# Patient Record
Sex: FEMALE
[De-identification: names, ages, dates, MRNs, and addresses within clinical notes are randomized per-mention and may not be internally consistent; named-entity substitution may affect disease eponyms.]

---

## 2024-06-11 ENCOUNTER — ASOB RESULT (OUTPATIENT)
Age: 34
End: 2024-06-11

## 2024-06-11 ENCOUNTER — APPOINTMENT (OUTPATIENT)
Dept: ANTEPARTUM | Facility: CLINIC | Age: 34
End: 2024-06-11

## 2024-06-11 ENCOUNTER — TRANSCRIPTION ENCOUNTER (OUTPATIENT)
Age: 34
End: 2024-06-11

## 2024-06-11 PROCEDURE — 93975 VASCULAR STUDY: CPT

## 2024-06-11 PROCEDURE — 36415 COLL VENOUS BLD VENIPUNCTURE: CPT

## 2024-06-11 PROCEDURE — 76813 OB US NUCHAL MEAS 1 GEST: CPT

## 2024-07-03 ENCOUNTER — ASOB RESULT (OUTPATIENT)
Age: 34
End: 2024-07-03

## 2024-07-03 ENCOUNTER — APPOINTMENT (OUTPATIENT)
Dept: ANTEPARTUM | Facility: CLINIC | Age: 34
End: 2024-07-03

## 2024-07-03 PROCEDURE — 76817 TRANSVAGINAL US OBSTETRIC: CPT

## 2024-07-03 PROCEDURE — 76805 OB US >/= 14 WKS SNGL FETUS: CPT | Mod: 59

## 2024-08-09 ENCOUNTER — ASOB RESULT (OUTPATIENT)
Age: 34
End: 2024-08-09

## 2024-08-09 ENCOUNTER — APPOINTMENT (OUTPATIENT)
Dept: ANTEPARTUM | Facility: CLINIC | Age: 34
End: 2024-08-09

## 2024-08-09 PROCEDURE — 76817 TRANSVAGINAL US OBSTETRIC: CPT

## 2024-08-09 PROCEDURE — 76811 OB US DETAILED SNGL FETUS: CPT | Mod: 59

## 2024-08-15 PROBLEM — Z00.00 ENCOUNTER FOR PREVENTIVE HEALTH EXAMINATION: Status: ACTIVE | Noted: 2024-08-15

## 2024-10-11 ENCOUNTER — ASOB RESULT (OUTPATIENT)
Age: 34
End: 2024-10-11

## 2024-10-11 ENCOUNTER — APPOINTMENT (OUTPATIENT)
Dept: ANTEPARTUM | Facility: CLINIC | Age: 34
End: 2024-10-11

## 2024-10-11 PROCEDURE — 76820 UMBILICAL ARTERY ECHO: CPT | Mod: 59

## 2024-10-11 PROCEDURE — 76819 FETAL BIOPHYS PROFIL W/O NST: CPT | Mod: 59

## 2024-10-11 PROCEDURE — 76816 OB US FOLLOW-UP PER FETUS: CPT

## 2024-11-12 ENCOUNTER — ASOB RESULT (OUTPATIENT)
Age: 34
End: 2024-11-12

## 2024-11-12 ENCOUNTER — APPOINTMENT (OUTPATIENT)
Dept: ANTEPARTUM | Facility: CLINIC | Age: 34
End: 2024-11-12

## 2024-11-12 PROCEDURE — 76820 UMBILICAL ARTERY ECHO: CPT | Mod: 59

## 2024-11-12 PROCEDURE — 76816 OB US FOLLOW-UP PER FETUS: CPT

## 2024-11-12 PROCEDURE — 76819 FETAL BIOPHYS PROFIL W/O NST: CPT | Mod: 59

## 2024-11-27 ENCOUNTER — APPOINTMENT (OUTPATIENT)
Dept: ANTEPARTUM | Facility: CLINIC | Age: 34
End: 2024-11-27

## 2024-11-27 ENCOUNTER — ASOB RESULT (OUTPATIENT)
Age: 34
End: 2024-11-27

## 2024-11-27 PROCEDURE — 76816 OB US FOLLOW-UP PER FETUS: CPT

## 2024-11-27 PROCEDURE — 76819 FETAL BIOPHYS PROFIL W/O NST: CPT

## 2024-11-27 PROCEDURE — 76820 UMBILICAL ARTERY ECHO: CPT | Mod: 59

## 2024-12-03 ENCOUNTER — INPATIENT (INPATIENT)
Facility: HOSPITAL | Age: 34
LOS: 1 days | Discharge: HOME | End: 2024-12-05
Attending: OBSTETRICS & GYNECOLOGY | Admitting: OBSTETRICS & GYNECOLOGY
Payer: COMMERCIAL

## 2024-12-03 VITALS
TEMPERATURE: 98 F | OXYGEN SATURATION: 100 % | HEART RATE: 85 BPM | DIASTOLIC BLOOD PRESSURE: 86 MMHG | RESPIRATION RATE: 16 BRPM | SYSTOLIC BLOOD PRESSURE: 121 MMHG

## 2024-12-03 DIAGNOSIS — O26.899 OTHER SPECIFIED PREGNANCY RELATED CONDITIONS, UNSPECIFIED TRIMESTER: ICD-10-CM

## 2024-12-03 LAB
BASOPHILS # BLD AUTO: 0.03 K/UL — SIGNIFICANT CHANGE UP (ref 0–0.2)
BASOPHILS NFR BLD AUTO: 0.3 % — SIGNIFICANT CHANGE UP (ref 0–2)
BLD GP AB SCN SERPL QL: NEGATIVE — SIGNIFICANT CHANGE UP
EOSINOPHIL # BLD AUTO: 0.07 K/UL — SIGNIFICANT CHANGE UP (ref 0–0.5)
EOSINOPHIL NFR BLD AUTO: 0.8 % — SIGNIFICANT CHANGE UP (ref 0–6)
HCT VFR BLD CALC: 34.7 % — SIGNIFICANT CHANGE UP (ref 34.5–45)
HGB BLD-MCNC: 12.1 G/DL — SIGNIFICANT CHANGE UP (ref 11.5–15.5)
IMM GRANULOCYTES NFR BLD AUTO: 0.3 % — SIGNIFICANT CHANGE UP (ref 0–0.9)
LYMPHOCYTES # BLD AUTO: 2.06 K/UL — SIGNIFICANT CHANGE UP (ref 1–3.3)
LYMPHOCYTES # BLD AUTO: 23.7 % — SIGNIFICANT CHANGE UP (ref 13–44)
MCHC RBC-ENTMCNC: 31.2 PG — SIGNIFICANT CHANGE UP (ref 27–34)
MCHC RBC-ENTMCNC: 34.9 G/DL — SIGNIFICANT CHANGE UP (ref 32–36)
MCV RBC AUTO: 89.4 FL — SIGNIFICANT CHANGE UP (ref 80–100)
MONOCYTES # BLD AUTO: 0.53 K/UL — SIGNIFICANT CHANGE UP (ref 0–0.9)
MONOCYTES NFR BLD AUTO: 6.1 % — SIGNIFICANT CHANGE UP (ref 2–14)
NEUTROPHILS # BLD AUTO: 5.99 K/UL — SIGNIFICANT CHANGE UP (ref 1.8–7.4)
NEUTROPHILS NFR BLD AUTO: 68.8 % — SIGNIFICANT CHANGE UP (ref 43–77)
NRBC # BLD: 0 /100 WBCS — SIGNIFICANT CHANGE UP (ref 0–0)
PLATELET # BLD AUTO: 218 K/UL — SIGNIFICANT CHANGE UP (ref 150–400)
RBC # BLD: 3.88 M/UL — SIGNIFICANT CHANGE UP (ref 3.8–5.2)
RBC # FLD: 13.5 % — SIGNIFICANT CHANGE UP (ref 10.3–14.5)
RH IG SCN BLD-IMP: POSITIVE — SIGNIFICANT CHANGE UP
RH IG SCN BLD-IMP: POSITIVE — SIGNIFICANT CHANGE UP
T PALLIDUM AB TITR SER: NEGATIVE — SIGNIFICANT CHANGE UP
WBC # BLD: 8.71 K/UL — SIGNIFICANT CHANGE UP (ref 3.8–10.5)
WBC # FLD AUTO: 8.71 K/UL — SIGNIFICANT CHANGE UP (ref 3.8–10.5)

## 2024-12-03 RX ORDER — KETOROLAC TROMETHAMINE 30 MG/ML
30 INJECTION INTRAMUSCULAR; INTRAVENOUS ONCE
Refills: 0 | Status: DISCONTINUED | OUTPATIENT
Start: 2024-12-03 | End: 2024-12-03

## 2024-12-03 RX ORDER — AMPICILLIN TRIHYDRATE 250 MG/5ML
1 SUSPENSION, RECONSTITUTED, ORAL (ML) ORAL EVERY 4 HOURS
Refills: 0 | Status: DISCONTINUED | OUTPATIENT
Start: 2024-12-03 | End: 2024-12-03

## 2024-12-03 RX ORDER — IBUPROFEN 200 MG
600 TABLET ORAL EVERY 6 HOURS
Refills: 0 | Status: DISCONTINUED | OUTPATIENT
Start: 2024-12-03 | End: 2024-12-05

## 2024-12-03 RX ORDER — SIMETHICONE 125 MG
80 CAPSULE ORAL EVERY 4 HOURS
Refills: 0 | Status: DISCONTINUED | OUTPATIENT
Start: 2024-12-03 | End: 2024-12-05

## 2024-12-03 RX ORDER — METOPROLOL TARTRATE 100 MG/1
50 TABLET, FILM COATED ORAL DAILY
Refills: 0 | Status: DISCONTINUED | OUTPATIENT
Start: 2024-12-03 | End: 2024-12-03

## 2024-12-03 RX ORDER — HYDROCORTISONE BUTYRATE 0.1 %
1 CREAM (GRAM) TOPICAL EVERY 6 HOURS
Refills: 0 | Status: DISCONTINUED | OUTPATIENT
Start: 2024-12-03 | End: 2024-12-05

## 2024-12-03 RX ORDER — IBUPROFEN 200 MG
600 TABLET ORAL EVERY 6 HOURS
Refills: 0 | Status: COMPLETED | OUTPATIENT
Start: 2024-12-03 | End: 2025-11-01

## 2024-12-03 RX ORDER — AMPICILLIN TRIHYDRATE 250 MG/5ML
2 SUSPENSION, RECONSTITUTED, ORAL (ML) ORAL ONCE
Refills: 0 | Status: COMPLETED | OUTPATIENT
Start: 2024-12-03 | End: 2024-12-03

## 2024-12-03 RX ORDER — WITCH HAZEL 50 G/100ML
1 SOLUTION RECTAL EVERY 4 HOURS
Refills: 0 | Status: DISCONTINUED | OUTPATIENT
Start: 2024-12-03 | End: 2024-12-05

## 2024-12-03 RX ORDER — TETANUS TOXOID, REDUCED DIPHTHERIA TOXOID AND ACELLULAR PERTUSSIS VACCINE, ADSORBED 5; 2.5; 8; 8; 2.5 [IU]/.5ML; [IU]/.5ML; UG/.5ML; UG/.5ML; UG/.5ML
0.5 SUSPENSION INTRAMUSCULAR ONCE
Refills: 0 | Status: DISCONTINUED | OUTPATIENT
Start: 2024-12-03 | End: 2024-12-05

## 2024-12-03 RX ORDER — TRISODIUM CITRATE DIHYDRATE AND CITRIC ACID MONOHYDRATE 500; 334 MG/5ML; MG/5ML
15 SOLUTION ORAL EVERY 6 HOURS
Refills: 0 | Status: DISCONTINUED | OUTPATIENT
Start: 2024-12-03 | End: 2024-12-03

## 2024-12-03 RX ORDER — .BETA.-CAROTENE, SODIUM ACETATE, ASCORBIC ACID, CHOLECALCIFEROL, .ALPHA.-TOCOPHEROL ACETATE, DL-, THIAMINE MONONITRATE, RIBOFLAVIN, NIACINAMIDE, PYRIDOXINE HYDROCHLORIDE, FOLIC ACID, CYANOCOBALAMIN, CALCIUM CARBONATE, FERROUS FUMARATE, ZINC OXIDE AND CUPRIC OXIDE 2000; 2000; 120; 400; 22; 1.84; 3; 20; 10; 1; 12; 200; 27; 25; 2 [IU]/1; [IU]/1; MG/1; [IU]/1; MG/1; MG/1; MG/1; MG/1; MG/1; MG/1; UG/1; MG/1; MG/1; MG/1; MG/1
1 TABLET ORAL DAILY
Refills: 0 | Status: DISCONTINUED | OUTPATIENT
Start: 2024-12-03 | End: 2024-12-05

## 2024-12-03 RX ORDER — 0.9 % SODIUM CHLORIDE 0.9 %
1000 INTRAVENOUS SOLUTION INTRAVENOUS
Refills: 0 | Status: DISCONTINUED | OUTPATIENT
Start: 2024-12-03 | End: 2024-12-03

## 2024-12-03 RX ORDER — BENZOCAINE 10 %
1 OINTMENT (GRAM) TOPICAL EVERY 6 HOURS
Refills: 0 | Status: DISCONTINUED | OUTPATIENT
Start: 2024-12-03 | End: 2024-12-05

## 2024-12-03 RX ORDER — METOPROLOL TARTRATE 100 MG/1
50 TABLET, FILM COATED ORAL DAILY
Refills: 0 | Status: DISCONTINUED | OUTPATIENT
Start: 2024-12-03 | End: 2024-12-05

## 2024-12-03 RX ORDER — SODIUM CHLORIDE 9 MG/ML
3 INJECTION, SOLUTION INTRAMUSCULAR; INTRAVENOUS; SUBCUTANEOUS EVERY 8 HOURS
Refills: 0 | Status: DISCONTINUED | OUTPATIENT
Start: 2024-12-03 | End: 2024-12-05

## 2024-12-03 RX ORDER — FENTANYL/BUPIVACAINE/NS/PF 2-625MCG/1
250 PLASTIC BAG, INJECTION (ML) INJECTION
Refills: 0 | Status: DISCONTINUED | OUTPATIENT
Start: 2024-12-03 | End: 2024-12-03

## 2024-12-03 RX ORDER — OXYCODONE HYDROCHLORIDE 30 MG/1
5 TABLET ORAL ONCE
Refills: 0 | Status: DISCONTINUED | OUTPATIENT
Start: 2024-12-03 | End: 2024-12-05

## 2024-12-03 RX ORDER — LANOLIN 72 %
1 OINTMENT (GRAM) TOPICAL EVERY 6 HOURS
Refills: 0 | Status: DISCONTINUED | OUTPATIENT
Start: 2024-12-03 | End: 2024-12-05

## 2024-12-03 RX ORDER — PRAMOXINE HYDROCHLORIDE 1 MG/ML
1 LOTION TOPICAL EVERY 4 HOURS
Refills: 0 | Status: DISCONTINUED | OUTPATIENT
Start: 2024-12-03 | End: 2024-12-05

## 2024-12-03 RX ORDER — ACETAMINOPHEN 500MG 500 MG/1
975 TABLET, COATED ORAL
Refills: 0 | Status: DISCONTINUED | OUTPATIENT
Start: 2024-12-03 | End: 2024-12-05

## 2024-12-03 RX ORDER — DIPHENHYDRAMINE HCL 25 MG
25 CAPSULE ORAL EVERY 6 HOURS
Refills: 0 | Status: DISCONTINUED | OUTPATIENT
Start: 2024-12-03 | End: 2024-12-05

## 2024-12-03 RX ORDER — OXYCODONE HYDROCHLORIDE 30 MG/1
5 TABLET ORAL
Refills: 0 | Status: DISCONTINUED | OUTPATIENT
Start: 2024-12-03 | End: 2024-12-05

## 2024-12-03 RX ORDER — CHLORHEXIDINE GLUCONATE 1.2 MG/ML
1 RINSE ORAL DAILY
Refills: 0 | Status: DISCONTINUED | OUTPATIENT
Start: 2024-12-03 | End: 2024-12-03

## 2024-12-03 RX ORDER — DIBUCAINE 1 %
1 OINTMENT (GRAM) TOPICAL EVERY 6 HOURS
Refills: 0 | Status: DISCONTINUED | OUTPATIENT
Start: 2024-12-03 | End: 2024-12-05

## 2024-12-03 RX ADMIN — ACETAMINOPHEN 500MG 975 MILLIGRAM(S): 500 TABLET, COATED ORAL at 14:56

## 2024-12-03 RX ADMIN — Medication 125 MILLILITER(S): at 05:39

## 2024-12-03 RX ADMIN — SODIUM CHLORIDE 3 MILLILITER(S): 9 INJECTION, SOLUTION INTRAMUSCULAR; INTRAVENOUS; SUBCUTANEOUS at 21:40

## 2024-12-03 RX ADMIN — Medication 216 GRAM(S): at 06:56

## 2024-12-03 RX ADMIN — Medication 108 GRAM(S): at 11:15

## 2024-12-03 RX ADMIN — Medication 600 MILLIGRAM(S): at 18:47

## 2024-12-03 RX ADMIN — ACETAMINOPHEN 500MG 975 MILLIGRAM(S): 500 TABLET, COATED ORAL at 21:07

## 2024-12-03 RX ADMIN — KETOROLAC TROMETHAMINE 30 MILLIGRAM(S): 30 INJECTION INTRAMUSCULAR; INTRAVENOUS at 14:10

## 2024-12-03 RX ADMIN — Medication 600 MILLIGRAM(S): at 23:51

## 2024-12-03 NOTE — OB RN TRIAGE NOTE - FALL HARM RISK - UNIVERSAL INTERVENTIONS
Bed in lowest position, wheels locked, appropriate side rails in place/Call bell, personal items and telephone in reach/Instruct patient to call for assistance before getting out of bed or chair/Non-slip footwear when patient is out of bed/[place] to call system/Physically safe environment - no spills, clutter or unnecessary equipment/Purposeful Proactive Rounding/Room/bathroom lighting operational, light cord in reach

## 2024-12-03 NOTE — OB PROVIDER DELIVERY SUMMARY - NSPROVIDERDELIVERYNOTE_OBGYN_ALL_OB_FT
Pt delivered  in OP position over intact perineum. Tight nuchal cord x 1 noted, which was reduced prior to delivery of the shoulders. Shoulders delivered spontaneously. The  was then placed on the maternal abdomen and cried vigorously. Delayed cord clamping x 1 minute was performed. The placenta then delivered spontaneously. A 2nd degree perineal laceration was noted, which was repaired with 2-0 vicryl with adequate hemostasis. EBL 300cc. Mother and baby recovering in LDR.

## 2024-12-03 NOTE — OB PROVIDER DELIVERY SUMMARY - NSSELHIDDEN_OBGYN_ALL_OB_FT
[[email or web address]_OBGYN_ALL_OB_FT:[id number]],[NS_DeliveryRN_OBGYN_ALL_OB_FT:[id number]=]

## 2024-12-03 NOTE — OB RN DELIVERY SUMMARY - NSSELHIDDEN_OBGYN_ALL_OB_FT
[[email or web address]_OBGYN_ALL_OB_FT:[id number]=],[NS_DeliveryRN_OBGYN_ALL_OB_FT:[id number]=] Detail Level: Generalized Detail Level: Simple

## 2024-12-03 NOTE — OB RN PATIENT PROFILE - NSICDXPASTMEDICALHX_GEN_ALL_CORE_FT
PAST MEDICAL HISTORY:  No pertinent past medical history PAST MEDICAL HISTORY:  SVT (supraventricular tachycardia)

## 2024-12-03 NOTE — OB PROVIDER H&P - HISTORY OF PRESENT ILLNESS
35 y/o  at 38w3d presenting for evaluation for painful contractions since . Per patient, she began to feel painful contractions since  yesterday; they have become more frequent and more painful. She does not want an epidural at this time. She endorses that contractions are more frequent and are occurring every 5-7 minutes. She denies loss of fluid and vaginal bleeding. She endorses normal fetal movement.    Ante: Spontaneous pregnancy. NIPT and anatomy scan wnl. Passed GCT. Denies elevated blood pressures in this pregnancy.  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 Taking Valtrex since week 36. Endorses remote hx of one abnormal pap smear in . Denies fibroids, ovarian cysts, STI  MedHX: Hx of SVT (previously on metoprolol 50 and self d'jaciel, seen by cardiology in this pregnancy with plan for ablation in postpartum period).  Surghx: denies  Medications: denies  Allergies: NKDA    Physical Exam:  T(C): 36.6 (24 @ 05:49), Max: 36.6 (24 @ 05:49)  HR: 91 (24 @ 05:49) (85 - 91)  BP: 127/72 (24 @ 05:49) (121/86 - 127/72)  RR: 17 (24 @ 05:49) (16 - 17)  SpO2: 100% (24 @ 05:49) (100% - 100%)    General: breathing hard through contractions  Pulm: no increased WOB  Abdomen: soft, gravid, nontender  Extremities: wnl     TAUS: Cephalic  VE: 4/70/-3  SSE: no lesions seen on cervix or in vaginal tissue    EFM: 135 bpm, mod variability, + accels, - decels; reactive and reassuring  [place]: Ctx q5-7 mins    Assessment and Plan  35 y/o  at 38w3d presenting for evaluation for painful contractions since .  - Admit to L&D  - Consent signed for pitocin and vaginal delivery; all risks/benefits/alternatives were discussed and patient agreed to above plan  - NPO  - IV fluids and labs ordered  - GBS +, amp ordered for infection ppx  - Continuous EFM     Discussed with Dr. Hampton and attending Dr. Reese Palma PGY1

## 2024-12-03 NOTE — OB RN DELIVERY SUMMARY - NS_SEPSISRSKCALC_OBGYN_ALL_OB_FT
EOS calculated successfully. EOS Risk Factor: 0.5/1000 live births ([facility] national incidence); GA=38w3d; Temp=98.8; ROM=3.217; GBS='Positive'; Antibiotics='No antibiotics or any antibiotics < 2 hrs prior to birth'

## 2024-12-03 NOTE — OB PROVIDER LABOR PROGRESS NOTE - NS_SUBJECTIVE/OBJECTIVE_OBGYN_ALL_OB_FT
EFM reviewed. Baseline 145, moderate variability, +accelerations, no decelerations. Category I tracing.   Ctx q1-2/10min.     SVE 5-6/80/-3 with bulging bag.  Continue expectant management.   Pt considering epidural.
Pt comfortable with epidural

## 2024-12-03 NOTE — OB PROVIDER H&P - NSLOWPPHRISK_OBGYN_A_OB
No previous uterine incision/Nguyen Pregnancy/Less than or equal to 4 previous vaginal births/No known bleeding disorder/No history of postpartum hemorrhage/No other PPH risks indicated

## 2024-12-04 PROCEDURE — 99253 IP/OBS CNSLTJ NEW/EST LOW 45: CPT

## 2024-12-04 RX ADMIN — SODIUM CHLORIDE 3 MILLILITER(S): 9 INJECTION, SOLUTION INTRAMUSCULAR; INTRAVENOUS; SUBCUTANEOUS at 22:50

## 2024-12-04 RX ADMIN — Medication 600 MILLIGRAM(S): at 05:57

## 2024-12-04 RX ADMIN — ACETAMINOPHEN 500MG 975 MILLIGRAM(S): 500 TABLET, COATED ORAL at 03:36

## 2024-12-04 RX ADMIN — Medication 600 MILLIGRAM(S): at 12:39

## 2024-12-04 RX ADMIN — Medication 600 MILLIGRAM(S): at 18:43

## 2024-12-04 RX ADMIN — .BETA.-CAROTENE, SODIUM ACETATE, ASCORBIC ACID, CHOLECALCIFEROL, .ALPHA.-TOCOPHEROL ACETATE, DL-, THIAMINE MONONITRATE, RIBOFLAVIN, NIACINAMIDE, PYRIDOXINE HYDROCHLORIDE, FOLIC ACID, CYANOCOBALAMIN, CALCIUM CARBONATE, FERROUS FUMARATE, ZINC OXIDE AND CUPRIC OXIDE 1 TABLET(S): 2000; 2000; 120; 400; 22; 1.84; 3; 20; 10; 1; 12; 200; 27; 25; 2 TABLET ORAL at 12:39

## 2024-12-04 RX ADMIN — SODIUM CHLORIDE 3 MILLILITER(S): 9 INJECTION, SOLUTION INTRAMUSCULAR; INTRAVENOUS; SUBCUTANEOUS at 06:01

## 2024-12-04 RX ADMIN — ACETAMINOPHEN 500MG 975 MILLIGRAM(S): 500 TABLET, COATED ORAL at 15:19

## 2024-12-04 RX ADMIN — ACETAMINOPHEN 500MG 975 MILLIGRAM(S): 500 TABLET, COATED ORAL at 20:56

## 2024-12-04 RX ADMIN — METOPROLOL TARTRATE 50 MILLIGRAM(S): 100 TABLET, FILM COATED ORAL at 06:25

## 2024-12-04 RX ADMIN — ACETAMINOPHEN 500MG 975 MILLIGRAM(S): 500 TABLET, COATED ORAL at 08:47

## 2024-12-04 NOTE — CONSULT NOTE ADULT - ATTENDING COMMENTS
Briefly, Patient is a 33 yo Female with Pmhx of SVT,  now s/p Uncomplicated  on 12/3. Cardiology is consulted for h/o SVT.   - Patient seen and examined at bedside  - She reports Hx of SVT for which she has seen a Cardiologist in the past including during her pregnancy. She discussed SVT ablation with her Cardiologist at OSH during her pregnancy, should symptoms persist but burden was not significant enough to proceed  - Post C section she has done well. She remains asymptomatic  - Tele review has revealed NSR without any tachyarrhythmias  - At this time, would maintain patient on Toprol 50 mg po QD  - She would benefit from ambulatory ECG monitoring for Arrythmia burden assessment  - Plan for SVT ablation, given recurrence and symptoms  - Please ensure patient has outpatient follow-up with Dr. Hannah at [facility] within 2 weeks of discharge   - Please call cardiology with any questions

## 2024-12-04 NOTE — LACTATION INITIAL EVALUATION - NS LACT CON REASON FOR REQ
Baby  well at the time of consult in cross cradle hold on the left breast and latch sustained for about 2-3 minutes until baby fell asleep satisfied. Baby was feeding for about 1 hours before LC arrived. Mother breasts and nipples assessment benign, see detail as below. All questions answered./multiparous mom

## 2024-12-04 NOTE — CONSULT NOTE ADULT - SUBJECTIVE AND OBJECTIVE BOX
HPI:  35 y/o F with h/o SVT,  now s/p  12/3, uncomplicated. Tolerated procedure well. Cardiology consulted for h/o SVT.     Patient seen and examined at the bedside. Reports she has had palpitations for years, especially while lying down at night. This past summer was drinking more diet co[place] in [place] and noted increasing episodes of palpitations. Denies associated chest pain, dizziness, shortness of breath. Upon returning [place] the  [place] [facility] [place] and planned for SVT ablation after pregnancy. She was taking the Toprol, however, she felt it was not working, so she stopped taking it.                (03 Dec 2024 07:16)        Pt seen and examined at bedside, NAD, ROS s/f ?, otherwise remainder of ROS is unremarkable     ROS: Per HPI    PAST MEDICAL & SURGICAL HISTORY:  SVT (supraventricular tachycardia)        SOCIAL HISTORY:  FAMILY HISTORY:    ALLERGIES: 	  No Known Allergies          MEDICATIONS:  acetaminophen     Tablet .. 975 milliGRAM(s) Oral <User Schedule>  benzocaine 20%/menthol 0.5% Spray 1 Spray(s) Topical every 6 hours PRN  dibucaine 1% Ointment 1 Application(s) Topical every 6 hours PRN  diphenhydrAMINE 25 milliGRAM(s) Oral every 6 hours PRN  diphtheria/tetanus/pertussis (acellular) Vaccine (Adacel) 0.5 milliLiter(s) IntraMuscular once  hydrocortisone 1% Cream 1 Application(s) Topical every 6 hours PRN  ibuprofen  Tablet. 600 milliGRAM(s) Oral every 6 hours  lanolin Ointment 1 Application(s) Topical every 6 hours PRN  magnesium hydroxide Suspension 30 milliLiter(s) Oral two times a day PRN  metoprolol succinate ER 50 milliGRAM(s) Oral daily  oxyCODONE    IR 5 milliGRAM(s) Oral every 3 hours PRN  oxyCODONE    IR 5 milliGRAM(s) Oral once PRN  oxytocin Infusion 167 milliUNIT(s)/Min IV Continuous <Continuous>  pramoxine 1%/zinc 5% Cream 1 Application(s) Topical every 4 hours PRN  prenatal multivitamin 1 Tablet(s) Oral daily  simethicone 80 milliGRAM(s) Chew every 4 hours PRN  sodium chloride 0.9% lock flush 3 milliLiter(s) IV Push every 8 hours  witch hazel Pads 1 Application(s) Topical every 4 hours PRN      T(C): 36.8 (24 @ 14:00), Max: 36.8 (24 @ 14:00)  HR: 78 (24 @ 14:00) (69 - 87)  BP: 106/56 (24 @ 14:00) (100/71 - 118/80)  RR: 18 (24 @ 14:00) (16 - 18)  SpO2: 98% (24 @ 14:00) (96% - 99%)    24 @ 07:01  -  24 @ 07:00  --------------------------------------------------------  IN: 0 mL / OUT: 450 mL / NET: -450 mL        PHYSICAL EXAM:  NAD, pleasant  RRR  CTAB  LE wwp      I&O's Summary    03 Dec 2024 07:01  -  04 Dec 2024 07:00  --------------------------------------------------------  IN: 0 mL / OUT: 450 mL / NET: -450 mL        LABS:	 	                        12.1   8.71  )-----------( 218      ( 03 Dec 2024 04:57 )             34.7               proBNP:   Lipid Profile:   HgA1c:   TSH:     TELEMETRY: 	    ECG:  	  RADIOLOGY:   ECHO:  STRESS:  CATH:   HPI:  33 y/o F with h/o SVT,  now s/p  12/3, uncomplicated. Tolerated procedure well. Cardiology consulted for h/o SVT.     Patient seen and examined at the bedside. Reports she has had palpitations for years, especially while lying down at night. This past summer was drinking more diet co[place] in [place] and noted increasing episodes of palpitations. Denies associated chest pain, dizziness, shortness of breath. Upon returning to the  she saw Dr. Hannah at [facility] who started her on Toprol and planned for SVT ablation after pregnancy. She was taking the Toprol, however, she felt it was not working, so she stopped taking it. Episodes of palpitations occur once per week. Denies h/o dizziness, syncope, stroke, MI, other cardiac history. She's amenable to continuing Toprol and following up with Dr. Hannah.                (03 Dec 2024 07:16)        Pt seen and examined at bedside, NAD, ROS s/f ?, otherwise remainder of ROS is unremarkable     ROS: Per HPI    PAST MEDICAL & SURGICAL HISTORY:  SVT (supraventricular tachycardia)        SOCIAL HISTORY:  FAMILY HISTORY:    ALLERGIES: 	  No Known Allergies          MEDICATIONS:  acetaminophen     Tablet .. 975 milliGRAM(s) Oral <User Schedule>  benzocaine 20%/menthol 0.5% Spray 1 Spray(s) Topical every 6 hours PRN  dibucaine 1% Ointment 1 Application(s) Topical every 6 hours PRN  diphenhydrAMINE 25 milliGRAM(s) Oral every 6 hours PRN  diphtheria/tetanus/pertussis (acellular) Vaccine (Adacel) 0.5 milliLiter(s) IntraMuscular once  hydrocortisone 1% Cream 1 Application(s) Topical every 6 hours PRN  ibuprofen  Tablet. 600 milliGRAM(s) Oral every 6 hours  lanolin Ointment 1 Application(s) Topical every 6 hours PRN  magnesium hydroxide Suspension 30 milliLiter(s) Oral two times a day PRN  metoprolol succinate ER 50 milliGRAM(s) Oral daily  oxyCODONE    IR 5 milliGRAM(s) Oral every 3 hours PRN  oxyCODONE    IR 5 milliGRAM(s) Oral once PRN  oxytocin Infusion 167 milliUNIT(s)/Min IV Continuous <Continuous>  pramoxine 1%/zinc 5% Cream 1 Application(s) Topical every 4 hours PRN  prenatal multivitamin 1 Tablet(s) Oral daily  simethicone 80 milliGRAM(s) Chew every 4 hours PRN  sodium chloride 0.9% lock flush 3 milliLiter(s) IV Push every 8 hours  witch hazel Pads 1 Application(s) Topical every 4 hours PRN      T(C): 36.8 (24 @ 14:00), Max: 36.8 (24 @ 14:00)  HR: 78 (24 @ 14:00) (69 - 87)  BP: 106/56 (24 @ 14:00) (100/71 - 118/80)  RR: 18 (24 @ 14:00) (16 - 18)  SpO2: 98% (24 @ 14:00) (96% - 99%)    24 @ 07:01  -  24 @ 07:00  --------------------------------------------------------  IN: 0 mL / OUT: 450 mL / NET: -450 mL        PHYSICAL EXAM:  NAD, pleasant  RRR  CTAB  NICOLE riddle      I&O's Summary    03 Dec 2024 07:01  -  04 Dec 2024 07:00  --------------------------------------------------------  IN: 0 mL / OUT: 450 mL / NET: -450 mL        LABS:	 	                        12.1   8.71  )-----------( 218      ( 03 Dec 2024 04:57 )             34.7               proBNP:   Lipid Profile:   HgA1c:   TSH:     TELEMETRY: 	    ECG:  	  RADIOLOGY:   ECHO:  STRESS:  CATH:   HPI:  33 y/o F with h/o SVT,  now s/p  12/3, uncomplicated. Tolerated procedure well. Cardiology consulted for h/o SVT.     Patient seen and examined at the bedside. Reports she has had palpitations for years, especially while lying down at night. This past summer was drinking more diet coke then normal in [place] and noted increasing episodes of palpitations. Denies associated chest pain, dizziness, shortness of breath. Upon returning to the  she saw Dr. Hannah at [facility] [place] who started her on Toprol and planned for SVT ablation after pregnancy. She was taking the Toprol, however, she felt it was not working, so she stopped taking it. Episodes of palpitations occur once per week. Denies h/o dizziness, syncope, stroke, MI, other cardiac history. She's amenable to continuing Toprol and following up with Dr. Hannah.                (03 Dec 2024 07:16)        Pt seen and examined at bedside, NAD, ROS s/f ?, otherwise remainder of ROS is unremarkable     ROS: Per HPI    PAST MEDICAL & SURGICAL HISTORY:  SVT (supraventricular tachycardia)        SOCIAL HISTORY:  FAMILY HISTORY:    ALLERGIES: 	  No Known Allergies          MEDICATIONS:  acetaminophen     Tablet .. 975 milliGRAM(s) Oral <User Schedule>  benzocaine 20%/menthol 0.5% Spray 1 Spray(s) Topical every 6 hours PRN  dibucaine 1% Ointment 1 Application(s) Topical every 6 hours PRN  diphenhydrAMINE 25 milliGRAM(s) Oral every 6 hours PRN  diphtheria/tetanus/pertussis (acellular) Vaccine (Adacel) 0.5 milliLiter(s) IntraMuscular once  hydrocortisone 1% Cream 1 Application(s) Topical every 6 hours PRN  ibuprofen  Tablet. 600 milliGRAM(s) Oral every 6 hours  lanolin Ointment 1 Application(s) Topical every 6 hours PRN  magnesium hydroxide Suspension 30 milliLiter(s) Oral two times a day PRN  metoprolol succinate ER 50 milliGRAM(s) Oral daily  oxyCODONE    IR 5 milliGRAM(s) Oral every 3 hours PRN  oxyCODONE    IR 5 milliGRAM(s) Oral once PRN  oxytocin Infusion 167 milliUNIT(s)/Min IV Continuous <Continuous>  pramoxine 1%/zinc 5% Cream 1 Application(s) Topical every 4 hours PRN  prenatal multivitamin 1 Tablet(s) Oral daily  simethicone 80 milliGRAM(s) Chew every 4 hours PRN  sodium chloride 0.9% lock flush 3 milliLiter(s) IV Push every 8 hours  witch hazel Pads 1 Application(s) Topical every 4 hours PRN      T(C): 36.8 (24 @ 14:00), Max: 36.8 (24 @ 14:00)  HR: 78 (24 @ 14:00) (69 - 87)  BP: 106/56 (24 @ 14:00) (100/71 - 118/80)  RR: 18 (24 @ 14:00) (16 - 18)  SpO2: 98% (24 @ 14:00) (96% - 99%)    24 @ 07:01  -  24 @ 07:00  --------------------------------------------------------  IN: 0 mL / OUT: 450 mL / NET: -450 mL        PHYSICAL EXAM:  NAD, pleasant  RRR  RODNEY Medley      I&O's Summary    03 Dec 2024 07:01  -  04 Dec 2024 07:00  --------------------------------------------------------  IN: 0 mL / OUT: 450 mL / NET: -450 mL        LABS:	 	                        12.1   8.71  )-----------( 218      ( 03 Dec 2024 04:57 )             34.7

## 2024-12-04 NOTE — LACTATION INITIAL EVALUATION - LACTATION INTERVENTIONS
initiate/review safe skin-to-skin/initiate/review hand expression/initiate/review techniques for position and latch/initiate/review supplementation plan due to medical indications/initiate/review breast massage/compression/reviewed components of an effective feeding and at least 8 effective feedings per day required/reviewed importance of monitoring infant diapers, the breastfeeding log, and minimum output each day/reviewed risks of artificial nipples/reviewed benefits and recommendations for rooming in/reviewed feeding on demand/by cue at least 8 times a day/reviewed indications of inadequate milk transfer that would require supplementation

## 2024-12-04 NOTE — CONSULT NOTE ADULT - ASSESSMENT
35 y/o F with h/o SVT,  now s/p  12/3, uncomplicated. Tolerated procedure well. Cardiology consulted for h/o SVT.     Tele review : NSR    #H/o SVT   currently in NSR   patient is euvolemic and warm on exam   She denies chest pain, sob, or palpitations  - continue home Toprol 50mg po qd   - please ensure patient [place] [facility] [place]     Please call back with questions    Case d/w Dr. Castelan

## 2024-12-05 ENCOUNTER — APPOINTMENT (OUTPATIENT)
Dept: ANTEPARTUM | Facility: CLINIC | Age: 34
End: 2024-12-05

## 2024-12-05 ENCOUNTER — TRANSCRIPTION ENCOUNTER (OUTPATIENT)
Age: 34
End: 2024-12-05

## 2024-12-05 VITALS
SYSTOLIC BLOOD PRESSURE: 105 MMHG | TEMPERATURE: 98 F | RESPIRATION RATE: 18 BRPM | DIASTOLIC BLOOD PRESSURE: 68 MMHG | OXYGEN SATURATION: 98 % | HEART RATE: 76 BPM

## 2024-12-05 PROCEDURE — 86780 TREPONEMA PALLIDUM: CPT

## 2024-12-05 PROCEDURE — 59050 FETAL MONITOR W/REPORT: CPT

## 2024-12-05 PROCEDURE — 85025 COMPLETE CBC W/AUTO DIFF WBC: CPT

## 2024-12-05 PROCEDURE — 36415 COLL VENOUS BLD VENIPUNCTURE: CPT

## 2024-12-05 PROCEDURE — 86850 RBC ANTIBODY SCREEN: CPT

## 2024-12-05 PROCEDURE — 86900 BLOOD TYPING SEROLOGIC ABO: CPT

## 2024-12-05 PROCEDURE — 86901 BLOOD TYPING SEROLOGIC RH(D): CPT

## 2024-12-05 RX ORDER — .BETA.-CAROTENE, SODIUM ACETATE, ASCORBIC ACID, CHOLECALCIFEROL, .ALPHA.-TOCOPHEROL ACETATE, DL-, THIAMINE MONONITRATE, RIBOFLAVIN, NIACINAMIDE, PYRIDOXINE HYDROCHLORIDE, FOLIC ACID, CYANOCOBALAMIN, CALCIUM CARBONATE, FERROUS FUMARATE, ZINC OXIDE AND CUPRIC OXIDE 2000; 2000; 120; 400; 22; 1.84; 3; 20; 10; 1; 12; 200; 27; 25; 2 [IU]/1; [IU]/1; MG/1; [IU]/1; MG/1; MG/1; MG/1; MG/1; MG/1; MG/1; UG/1; MG/1; MG/1; MG/1; MG/1
0 TABLET ORAL
Qty: 0 | Refills: 0 | DISCHARGE

## 2024-12-05 RX ORDER — VALACYCLOVIR HYDROCHLORIDE 1 G/1
0 TABLET, FILM COATED ORAL
Refills: 0 | DISCHARGE

## 2024-12-05 RX ORDER — IBUPROFEN 200 MG
1 TABLET ORAL
Qty: 0 | Refills: 0 | DISCHARGE
Start: 2024-12-05

## 2024-12-05 RX ORDER — METOPROLOL TARTRATE 100 MG/1
0 TABLET, FILM COATED ORAL
Refills: 0 | DISCHARGE

## 2024-12-05 RX ORDER — ACETAMINOPHEN 500MG 500 MG/1
3 TABLET, COATED ORAL
Qty: 0 | Refills: 0 | DISCHARGE
Start: 2024-12-05

## 2024-12-05 RX ORDER — METOPROLOL TARTRATE 100 MG/1
1 TABLET, FILM COATED ORAL
Qty: 0 | Refills: 0 | DISCHARGE
Start: 2024-12-05

## 2024-12-05 RX ADMIN — ACETAMINOPHEN 500MG 975 MILLIGRAM(S): 500 TABLET, COATED ORAL at 09:57

## 2024-12-05 RX ADMIN — Medication 600 MILLIGRAM(S): at 00:02

## 2024-12-05 RX ADMIN — .BETA.-CAROTENE, SODIUM ACETATE, ASCORBIC ACID, CHOLECALCIFEROL, .ALPHA.-TOCOPHEROL ACETATE, DL-, THIAMINE MONONITRATE, RIBOFLAVIN, NIACINAMIDE, PYRIDOXINE HYDROCHLORIDE, FOLIC ACID, CYANOCOBALAMIN, CALCIUM CARBONATE, FERROUS FUMARATE, ZINC OXIDE AND CUPRIC OXIDE 1 TABLET(S): 2000; 2000; 120; 400; 22; 1.84; 3; 20; 10; 1; 12; 200; 27; 25; 2 TABLET ORAL at 12:34

## 2024-12-05 RX ADMIN — Medication 600 MILLIGRAM(S): at 06:34

## 2024-12-05 RX ADMIN — METOPROLOL TARTRATE 50 MILLIGRAM(S): 100 TABLET, FILM COATED ORAL at 06:34

## 2024-12-05 RX ADMIN — SODIUM CHLORIDE 3 MILLILITER(S): 9 INJECTION, SOLUTION INTRAMUSCULAR; INTRAVENOUS; SUBCUTANEOUS at 06:20

## 2024-12-05 RX ADMIN — ACETAMINOPHEN 500MG 975 MILLIGRAM(S): 500 TABLET, COATED ORAL at 10:30

## 2024-12-05 RX ADMIN — Medication 600 MILLIGRAM(S): at 12:34

## 2024-12-05 NOTE — DISCHARGE NOTE OB - HOSPITAL COURSE
Patient had uncomplicated . Patient with known history of SVT. She was monitored on telemetry and remained non-tachycardic. Postpartum course was unremarkable and she remained hemodynamically stable and afebrile throughout. Upon discharge on PPD2, the patient is ambulating and voiding spontaneously, tolerating oral intake, pain was well controlled with oral medication, and vital signs were stable. Patient stable for discharge. She will follow-up with her cardiologist 2 weeks after discharge.

## 2024-12-05 NOTE — PROGRESS NOTE ADULT - SUBJECTIVE AND OBJECTIVE BOX
Patient evaluated at bedside this morning, resting comfortable in bed, no acute events overnight.  She reports pain is well controlled with tylenol and motrin.  She denies headache, dizziness, chest pain, palpitations, shortness of breath, nausea, vomiting, heavy vaginal bleeding or perineal discomfort. Reports decrease in amount of vaginal bleeding and denies clots.  She has been ambulating without assistance, voiding spontaneously.  Tolerating food well, without nausea/vomit.      Physical Exam:  T(C): 36.7 (12-04-24 @ 06:20), Max: 36.7 (12-04-24 @ 06:20)  HR: 72 (12-04-24 @ 06:20) (69 - 81)  BP: 118/80 (12-04-24 @ 06:20) (100/71 - 118/80)  RR: 17 (12-04-24 @ 06:20) (16 - 18)  SpO2: 97% (12-04-24 @ 06:20) (97% - 99%)    GA: NAD, comfortable, conversational  Abd: soft, nontender, nondistended, no rebound or guarding, uterus firm.  Extremities: no swelling or calf tenderness  Perineum: lochia wnl                          12.1   8.71  )-----------( 218      ( 03 Dec 2024 04:57 )             34.7           acetaminophen     Tablet .. 975 milliGRAM(s) Oral <User Schedule>  benzocaine 20%/menthol 0.5% Spray 1 Spray(s) Topical every 6 hours PRN  dibucaine 1% Ointment 1 Application(s) Topical every 6 hours PRN  diphenhydrAMINE 25 milliGRAM(s) Oral every 6 hours PRN  diphtheria/tetanus/pertussis (acellular) Vaccine (Adacel) 0.5 milliLiter(s) IntraMuscular once  hydrocortisone 1% Cream 1 Application(s) Topical every 6 hours PRN  ibuprofen  Tablet. 600 milliGRAM(s) Oral every 6 hours  lanolin Ointment 1 Application(s) Topical every 6 hours PRN  magnesium hydroxide Suspension 30 milliLiter(s) Oral two times a day PRN  metoprolol succinate ER 50 milliGRAM(s) Oral daily  oxyCODONE    IR 5 milliGRAM(s) Oral every 3 hours PRN  oxyCODONE    IR 5 milliGRAM(s) Oral once PRN  oxytocin Infusion 167 milliUNIT(s)/Min IV Continuous <Continuous>  pramoxine 1%/zinc 5% Cream 1 Application(s) Topical every 4 hours PRN  prenatal multivitamin 1 Tablet(s) Oral daily  simethicone 80 milliGRAM(s) Chew every 4 hours PRN  sodium chloride 0.9% lock flush 3 milliLiter(s) IV Push every 8 hours  witch hazel Pads 1 Application(s) Topical every 4 hours PRN  
Patient evaluated at bedside this morning, resting comfortable in bed, no acute events overnight.  She reports pain is well controlled with tylenol and motrin.  Denies headache, visual changes, chest pain, SOB, RUQ/epigastric pain.   She denies dizziness, palpitations, nausea, vomiting, heavy vaginal bleeding or perineal discomfort. Reports decrease in amount of vaginal bleeding and denies clots.  She has been ambulating without assistance, voiding spontaneously.  Tolerating food well, without nausea/vomit.      Physical Exam:  T(C): 36.8 (12-05-24 @ 05:53), Max: 36.8 (12-05-24 @ 05:53)  HR: 67 (12-05-24 @ 05:53) (54 - 67)  BP: 109/70 (12-05-24 @ 05:53) (95/56 - 109/70)  RR: 18 (12-05-24 @ 05:53) (18 - 20)  SpO2: 99% (12-05-24 @ 05:53) (96% - 99%)    GA: NAD, comfortable, conversational  Abd: soft, nontender, nondistended, no rebound or guarding, uterus firm.  Extremities: no swelling or calf tenderness  Perineum: lochia wnl            acetaminophen     Tablet .. 975 milliGRAM(s) Oral <User Schedule>  benzocaine 20%/menthol 0.5% Spray 1 Spray(s) Topical every 6 hours PRN  dibucaine 1% Ointment 1 Application(s) Topical every 6 hours PRN  diphenhydrAMINE 25 milliGRAM(s) Oral every 6 hours PRN  diphtheria/tetanus/pertussis (acellular) Vaccine (Adacel) 0.5 milliLiter(s) IntraMuscular once  hydrocortisone 1% Cream 1 Application(s) Topical every 6 hours PRN  ibuprofen  Tablet. 600 milliGRAM(s) Oral every 6 hours  lanolin Ointment 1 Application(s) Topical every 6 hours PRN  magnesium hydroxide Suspension 30 milliLiter(s) Oral two times a day PRN  metoprolol succinate ER 50 milliGRAM(s) Oral daily  oxyCODONE    IR 5 milliGRAM(s) Oral every 3 hours PRN  oxyCODONE    IR 5 milliGRAM(s) Oral once PRN  oxytocin Infusion 167 milliUNIT(s)/Min IV Continuous <Continuous>  pramoxine 1%/zinc 5% Cream 1 Application(s) Topical every 4 hours PRN  prenatal multivitamin 1 Tablet(s) Oral daily  simethicone 80 milliGRAM(s) Chew every 4 hours PRN  sodium chloride 0.9% lock flush 3 milliLiter(s) IV Push every 8 hours  witch hazel Pads 1 Application(s) Topical every 4 hours PRN  
Patient evaluated at bedside.   She reports pain is well controlled with OPM  She denies headache, dizziness, chest pain, palpitations, shortness of breath, nausea, vomiting, heavy vaginal bleeding or perineal discomfort.  She has been ambulating without assistance, voiding spontaneously, and is breastfeeding.    Physical Exam:  T(C): 36.7 (24 @ 10:00), Max: 36.8 (24 @ 05:53)  HR: 76 (24 @ 10:00) (56 - 76)  BP: 105/68 (24 @ 10:00) (105/68 - 109/70)  RR: 18 (24 @ 10:00) (18 - 18)  SpO2: 98% (24 @ 10:00) (96% - 99%)    GA: NAD, A+0 x 3  Breasts: soft, nontender, no palpable masses  Abd: + BS, soft, nontender, nondistended, no rebound or guarding, uterus firm at midline, 2  fb below umbilicus  : lochia WNL  Extremities: no calf tenderness    A/P 34y s/p , PPD #2  ,stable  1. Pain: well controlled on OPM  2. GI: Regular diet  3. : s.p huff  4. DVT prophylaxis: SCDs, ambulate  5. Dispo: DC home, precautions reviewed

## 2024-12-05 NOTE — DISCHARGE NOTE OB - PATIENT PORTAL LINK FT
none
You can access the FollowMyHealth Patient Portal offered by [facility] by registering at the following website: [email or web address]. By joining [facility]’s FollowMyHealth portal, you will also be able to view your health information using other applications (apps) compatible with our system.

## 2024-12-05 NOTE — DISCHARGE NOTE OB - MEDICATION SUMMARY - MEDICATIONS TO TAKE
I will START or STAY ON the medications listed below when I get home from the hospital:    ibuprofen 600 mg oral tablet  -- 1 tab(s) by mouth every 6 hours  -- Indication: For Pain    acetaminophen 325 mg oral tablet  -- 3 tab(s) by mouth every 6 hours  -- Indication: For Pain    multivitamin, prenatal  -- once a day  -- Indication: For Postpartum   I will START or STAY ON the medications listed below when I get home from the hospital:    ibuprofen 600 mg oral tablet  -- 1 tab(s) by mouth every 6 hours  -- Indication: For Pain    acetaminophen 325 mg oral tablet  -- 3 tab(s) by mouth every 6 hours  -- Indication: For Pain    metoprolol succinate 50 mg oral tablet, extended release  -- 1 tab(s) by mouth once a day  -- Indication: For hx of SVT    multivitamin, prenatal  -- once a day  -- Indication: For Postpartum

## 2024-12-05 NOTE — DISCHARGE NOTE OB - PLAN OF CARE
Follow up in the office with your doctor in two weeks. Please call to confirm your appointment. You can eat a regular diet. Please take 600mg Mortin every 6 hours and/or Tylenol 975mg every six hours as needed for pain. No heavy lifting and nothing in the vagina until cleared by your doctor. Please call your doctor with any questions or concerns. Follow-up with your cardiologist in 2 weeks after discharge. Follow-up with your cardiologist within 2 weeks after discharge. Continue taking Toprol 50mg qD.

## 2024-12-05 NOTE — PROGRESS NOTE ADULT - ASSESSMENT
A/P 34y s/p  with hx of SVT, PPD#1, stable, meeting postpartum milestones   #Hx of SVT:  - HR normal at this time  - Patient denies palpitations, chest pain, or SOB  - Continue to monitor on telemetry    #PPC  - Pain: well controlled on tylenol/motrin  - GI: Tolerating regular diet  - : urinating without difficulty/pain  - DVT prophylaxis: ambulating frequently  - Dispo: PPD 2, unless otherwise specified    
A/P 34y s/p  with hx of SVT, PPD#2, stable, meeting postpartum milestones   #Hx of SVT:  - HR normal at this time  - Patient denies palpitations, chest pain, or SOB    #PPC  - Pain: well controlled on tylenol/motrin  - GI: Tolerating regular diet  - : urinating without difficulty/pain  - DVT prophylaxis: ambulating frequently  - Dispo: PPD 2, unless otherwise specified

## 2024-12-05 NOTE — DISCHARGE NOTE OB - FINANCIAL ASSISTANCE
[facility] provides services at a reduced cost to those who are determined to be eligible through [facility]’s financial assistance program. Information regarding [facility]’s financial assistance program can be found by going to [email or web address] or by calling [phone].

## 2024-12-05 NOTE — DISCHARGE NOTE OB - CARE PROVIDER_API CALL
Kaylee Golden  Obstetrics and Gynecology  [address] [place] [id number]  Phone: [phone]  Fax: [phone]  Follow Up Time:

## 2024-12-05 NOTE — DISCHARGE NOTE OB - CARE PLAN
Principal Discharge DX:	Postpartum state  Assessment and plan of treatment:	Follow up in the office with your doctor in two weeks. Please call to confirm your appointment. You can eat a regular diet. Please take 600mg Mortin every 6 hours and/or Tylenol 975mg every six hours as needed for pain. No heavy lifting and nothing in the vagina until cleared by your doctor. Please call your doctor with any questions or concerns.  Secondary Diagnosis:	SVT (supraventricular tachycardia)  Assessment and plan of treatment:	Follow-up with your cardiologist in 2 weeks after discharge.   1 Principal Discharge DX:	Postpartum state  Assessment and plan of treatment:	Follow up in the office with your doctor in two weeks. Please call to confirm your appointment. You can eat a regular diet. Please take 600mg Mortin every 6 hours and/or Tylenol 975mg every six hours as needed for pain. No heavy lifting and nothing in the vagina until cleared by your doctor. Please call your doctor with any questions or concerns.  Secondary Diagnosis:	SVT (supraventricular tachycardia)  Assessment and plan of treatment:	Follow-up with your cardiologist within 2 weeks after discharge. Continue taking Toprol 50mg qD.

## 2024-12-11 DIAGNOSIS — Z3A.38 38 WEEKS GESTATION OF PREGNANCY: ICD-10-CM

## 2024-12-11 DIAGNOSIS — A60.00 HERPESVIRAL INFECTION OF UROGENITAL SYSTEM, UNSPECIFIED: ICD-10-CM

## 2024-12-11 DIAGNOSIS — I47.10 SUPRAVENTRICULAR TACHYCARDIA, UNSPECIFIED: ICD-10-CM

## 2024-12-11 DIAGNOSIS — Z28.09 IMMUNIZATION NOT CARRIED OUT BECAUSE OF OTHER CONTRAINDICATION: ICD-10-CM

## 2024-12-23 NOTE — OB RN PATIENT PROFILE - BREASTFEEDING PROVIDES MATERNAL HEALTH BENEFITS, DECREASED PREMENOPAUSAL BREAST CANCER, OVARIAN CANCER AND TYPE II DIABETES MELLITUS
Called timothy at [phone], to discuss the additional information, there was no fax received from the [facility] pod, and nothing scanned in to the pt's chart. Spoke with Taty who states there needs to be clinical documentation why the pt needs to be on this dose, I did let her know that I sent over all chart notes along with the letter of appeal on 12/20. She stated that as of now they have not received anything, I reviewed with her the fax number that it was sent to [phone], and that was correct. I re faxed the letter of appeal and chart notes to a second fax of [phone] with EOC # [id number] attached.   Statement Selected